# Patient Record
Sex: MALE | Race: WHITE | NOT HISPANIC OR LATINO | Employment: FULL TIME | URBAN - METROPOLITAN AREA
[De-identification: names, ages, dates, MRNs, and addresses within clinical notes are randomized per-mention and may not be internally consistent; named-entity substitution may affect disease eponyms.]

---

## 2017-10-16 ENCOUNTER — APPOINTMENT (OUTPATIENT)
Dept: GENERAL RADIOLOGY | Facility: HOSPITAL | Age: 22
End: 2017-10-16

## 2017-10-16 ENCOUNTER — HOSPITAL ENCOUNTER (EMERGENCY)
Facility: HOSPITAL | Age: 22
Discharge: HOME | End: 2017-10-16
Admitting: EMERGENCY MEDICINE

## 2017-10-16 VITALS
WEIGHT: 215 LBS | HEIGHT: 65 IN | RESPIRATION RATE: 16 BRPM | TEMPERATURE: 98.1 F | OXYGEN SATURATION: 98 % | BODY MASS INDEX: 35.82 KG/M2 | DIASTOLIC BLOOD PRESSURE: 80 MMHG | SYSTOLIC BLOOD PRESSURE: 123 MMHG | HEART RATE: 70 BPM

## 2017-10-16 DIAGNOSIS — B34.9 VIRAL SYNDROME: Primary | ICD-10-CM

## 2017-10-16 LAB
FLUAV AG NPH QL: NEGATIVE
FLUBV AG NPH QL IA: NEGATIVE
HETEROPH AB SER QL LA: NEGATIVE
HOLD SPECIMEN: NORMAL
HOLD SPECIMEN: NORMAL
S PYO AG THROAT QL: NEGATIVE
WHOLE BLOOD HOLD SPECIMEN: NORMAL
WHOLE BLOOD HOLD SPECIMEN: NORMAL

## 2017-10-16 PROCEDURE — 99284 EMERGENCY DEPT VISIT MOD MDM: CPT | Performed by: PHYSICIAN ASSISTANT

## 2017-10-16 PROCEDURE — 87081 CULTURE SCREEN ONLY: CPT | Performed by: PHYSICIAN ASSISTANT

## 2017-10-16 PROCEDURE — 99283 EMERGENCY DEPT VISIT LOW MDM: CPT

## 2017-10-16 PROCEDURE — 87880 STREP A ASSAY W/OPTIC: CPT | Performed by: PHYSICIAN ASSISTANT

## 2017-10-16 PROCEDURE — 86308 HETEROPHILE ANTIBODY SCREEN: CPT | Performed by: PHYSICIAN ASSISTANT

## 2017-10-16 PROCEDURE — 71020 HC CHEST PA AND LATERAL: CPT

## 2017-10-16 PROCEDURE — 87804 INFLUENZA ASSAY W/OPTIC: CPT | Performed by: PHYSICIAN ASSISTANT

## 2017-10-16 RX ORDER — BENZONATATE 100 MG/1
100 CAPSULE ORAL 3 TIMES DAILY PRN
Qty: 20 CAPSULE | Refills: 0 | Status: SHIPPED | OUTPATIENT
Start: 2017-10-16 | End: 2017-10-23

## 2017-10-16 NOTE — DISCHARGE INSTRUCTIONS
Return to the emergency department with worsening symptoms, uncontrolled pain, inability to tolerate oral liquids, fever greater than 101°F not controlled by Tylenol or Motrin or as needed with emergent concerns.

## 2017-10-16 NOTE — ED PROVIDER NOTES
Subjective   History of Present Illness  History of Present Illness    Chief complaint: flu like symptoms    Location: Generalized    Quality/Severity:  Aching, mild to moderate    Timing/Duration: One day    Modifying Factors: Nothing makes worse or better    Associated Symptoms: Positive nausea.  Positive fatigue.  No vomiting or diarrhea.  Positive sore throat.  Positive nonproductive cough.  Positive headache.  Denies neck pain.  Denies photophobia.  Denies chest pain or shortness of breath.    Narrative: 21-year-old male presents with flulike symptoms for one day.  He states he has had been exposed to many people with the flu at work.  He denies any fevers or chills.    Review of Systems  General: Denies fevers or chills. + fatigue.  Denies any weight loss or weight gain.  SKIN: Denies any rashes lesions or ulcers.  Denies color change.  ENT: Positive sore throat.  Denies rhinorrhea.  Denies ear pain.    EYES: Denies any blurred vision.  Denies any change in vision.  Denies any photophobia.  Denies any vision loss.  LUNGS: Denies any shortness of breath or wheezing.  +cough.  Denies any hemoptysis.  CARDIAC: Denies any chest pain.  Denies palpitations.  Denies syncope.  Denies any edema  ABD: Denies any abdominal pain. +nausea.  Denies vomiting or diarrhea.  Denies any rectal bleeding.  Denies constipation  : Denies any dysuria, urgency, frequency or hematuria.  Denies discharge.  Denies flank pain.  NEURO: Denies any focal weakness.  Denies headache.  Denies seizures.  Denies changes in speech or difficulty walking.  ENDOCRINE: Denies polydipsia and polyuria  M/S: Denies arthralgias, back pain, myalgias or neck pain  HEME/LYMPH: Negative for adenopathy. Does not bruise/bleed easily.   PSYCH: Negative for suicidal ideas. Denies anxiety or depression  review was performed in addition to those in the above all other reviews are negative.      Past Medical History:   Diagnosis Date   • Hypertension        No  Known Allergies    Past Surgical History:   Procedure Laterality Date   • ADENOIDECTOMY     • SHOULDER SURGERY     • TONSILLECTOMY         No family history on file.    Social History     Social History   • Marital status: Single     Spouse name: N/A   • Number of children: N/A   • Years of education: N/A     Social History Main Topics   • Smoking status: Never Smoker   • Smokeless tobacco: Not on file   • Alcohol use No   • Drug use: No   • Sexual activity: Defer     Other Topics Concern   • Not on file     Social History Narrative   • No narrative on file     No current facility-administered medications on file prior to encounter.      No current outpatient prescriptions on file prior to encounter.             Objective   Physical Exam  Vitals:    10/16/17 1341   BP: 152/90   Pulse: 94   Resp: 20   Temp: 98.5 °F (36.9 °C)   SpO2: 98%     GENERAL: a/o x 4, NAD  SKIN: Warm pink and dry   HEENT:  PERRLA, EOM intact, conjunctiva normal, sclera clear, TM clear, oropharynx clear with no significant pharyngeal erythema or exudate  NECK: supple, no JVD  LUNGS: Minutes breath sounds on the right otherwise clear to auscultation bilaterally.  No wheezes, rales or rhonchi.  No accessory muscle use and no nasal flaring.  CARDIAC:  Regular rate and rhythm, S1-S2.  No murmurs, rubs or gallops.  No peripheral edema.  Equal pulses bilaterally.  ABDOMEN: Soft, nontender, nondistended.  No guarding or rebound tenderness.  Normal bowel sounds.  MUSCULOSKELETAL: Moves all extremities well.  No deformity.  NEURO: Cranial nerves II through XII grossly intact.  No gross focal deficits.  Alert.  Normal speech and motor.  PSYCH: Normal mood and affect      Procedures         ED Course  ED Course      Results for orders placed or performed during the hospital encounter of 10/16/17   Influenza Antigen, Rapid - Swab, Nasopharynx   Result Value Ref Range    Influenza A Ag, EIA Negative Negative    Influenza B Ag, EIA Negative Negative   Rapid  Strep A Screen - Swab, Throat   Result Value Ref Range    Strep A Ag Negative Negative   Mononucleosis Screen   Result Value Ref Range    Monospot Negative Negative     Xr Chest 2 View    Result Date: 10/16/2017  Narrative: INDICATION:  Cough and weakness.  COMPARISON:  None.  FINDINGS: PA and lateral views of the chest.  Heart and mediastinal contours are normal.  The lungs are clear.  No pneumothorax or pleural effusion.      Impression: Normal chest radiograph.  This report was finalized on 10/16/2017 2:48 PM by Dr. Angel Stone MD.      Reviewed CXR. Independently viewed by me. Interpreted by radiologist. Discussed with pt.    D/c with tessalon. Education given. Pt has been counseled on elevated BP and instructed to f/u with PMD for repeat BP check.            MDM  Number of Diagnoses or Management Options  Viral syndrome: new and requires workup     Amount and/or Complexity of Data Reviewed  Clinical lab tests: reviewed and ordered  Tests in the radiology section of CPT®: reviewed and ordered  Tests in the medicine section of CPT®: ordered and reviewed  Independent visualization of images, tracings, or specimens: yes    Risk of Complications, Morbidity, and/or Mortality  Presenting problems: low  Diagnostic procedures: low  Management options: low    Patient Progress  Patient progress: improved      Final diagnoses:   Viral syndrome     Dictated utilizing Dragon dictation           Lynn Dumas PA-C  10/16/17 [id number]

## 2017-10-18 LAB — BACTERIA SPEC AEROBE CULT: NORMAL

## 2017-10-24 ENCOUNTER — HOSPITAL ENCOUNTER (EMERGENCY)
Facility: HOSPITAL | Age: 22
Discharge: HOME | End: 2017-10-24
Admitting: PHYSICIAN ASSISTANT

## 2017-10-24 VITALS
RESPIRATION RATE: 12 BRPM | WEIGHT: 215 LBS | TEMPERATURE: 98.7 F | OXYGEN SATURATION: 97 % | HEIGHT: 65 IN | BODY MASS INDEX: 35.82 KG/M2 | DIASTOLIC BLOOD PRESSURE: 92 MMHG | SYSTOLIC BLOOD PRESSURE: 128 MMHG | HEART RATE: 83 BPM

## 2017-10-24 DIAGNOSIS — M67.40 GANGLION CYST: Primary | ICD-10-CM

## 2017-10-24 PROCEDURE — 99282 EMERGENCY DEPT VISIT SF MDM: CPT | Performed by: PHYSICIAN ASSISTANT

## 2017-10-24 PROCEDURE — 99283 EMERGENCY DEPT VISIT LOW MDM: CPT

## 2017-10-24 RX ORDER — NAPROXEN 375 MG/1
375 TABLET ORAL 2 TIMES DAILY PRN
Qty: 20 TABLET | Refills: 0 | Status: SHIPPED | OUTPATIENT
Start: 2017-10-24

## 2017-10-24 RX ORDER — IBUPROFEN 400 MG/1
800 TABLET ORAL ONCE
Status: COMPLETED | OUTPATIENT
Start: 2017-10-24 | End: 2017-10-24

## 2017-10-24 RX ADMIN — IBUPROFEN 800 MG: 400 TABLET ORAL at 19:19

## 2017-10-24 NOTE — ED PROVIDER NOTES
"Subjective   History of Present Illness  History of Present Illness    Chief complaint: Bump on left wrist    Location: Left wrist    Quality/Severity:  \"Pain\", mild    Timing/Duration: One year, gradually getting bigger    Modifying Factors: Worse with movement.  Nothing specific makes better although patient has not tried anything.    Associated Symptoms: Denies numbness or tingling.  Denies fevers or chills.  Denies redness.  Denies trauma.    Narrative: 21-year-old male presents with swelling to his left wrist for over a year.  He has not talked her primary care provider about this.  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    Review of Systems  General: Denies fevers or chills.  Denies any weakness or fatigue.  Denies any weight loss or weight gain.  SKIN: Denies any rashes lesions or ulcers.  Denies color change.    HEENT:  Denies any change in vision.  LUNGS: Denies any shortness of breath or wheezing.    CARDIAC: Denies any chest pain.  Denies palpitations.  Denies syncope.  Denies any edema  ABD: Denies any abdominal pain.  Denies any nausea or vomiting or diarrhea.    : Denies any dysuria, urgency, frequency or hematuria.    NEURO: Denies any weakness.  Denies headache.  Denies seizures.  Denies changes in speech or difficulty walking.  M/S: Denies arthralgias, back pain, myalgias or neck pain  PSYCH: Negative for suicidal ideas. Denies anxiety or depression   review was performed in addition to those in the above all other reviews are negative.    Past Medical History:   Diagnosis Date   • Hypertension        No Known Allergies    Past Surgical History:   Procedure Laterality Date   • ADENOIDECTOMY     • KNEE SURGERY     • SHOULDER SURGERY     • TONSILLECTOMY         History reviewed. No pertinent family history.    Social History     Social History   • Marital status: Single     Spouse name: N/A   • Number of children: N/A   • Years of education: N/A     Social History Main Topics   • " Smoking status: Never Smoker   • Smokeless tobacco: Current User     Types: Chew   • Alcohol use No   • Drug use: No   • Sexual activity: Defer     Other Topics Concern   • None     Social History Narrative   • None     No current facility-administered medications on file prior to encounter.      Current Outpatient Prescriptions on File Prior to Encounter   Medication Sig Dispense Refill   • [] benzonatate (TESSALON) 100 MG capsule Take 1 capsule by mouth 3 (Three) Times a Day As Needed for Cough for up to 7 days. 20 capsule 0           Objective   Physical Exam  Vitals:    10/24/17 1856   BP: 147/85   Pulse: 88   Resp: 12   Temp: 98.7 °F (37.1 °C)   SpO2: 97%     GENERAL: Alert and oriented ×4.  No apparent distress.  SKIN: Warm, pink and dry  HEENT: Atraumatic normocephalic  LUNGS: Clear to auscultation bilaterally without wheezes, rales or rhonchi  CARDIAC: Regular rate and rhythm.  S1 and S2.  No murmurs, rubs or gallops.  ABD: Soft, nontender  M/S: MAEW, no deformity, left posterior wrist with small nodular swelling approximately 1 cm.  There is no erythema.  There is no fluctuance or induration.  Full range of motion to the wrist, fingers, elbow and shoulder  PSYCH: Normal mood and affect    Procedures         ED Course  ED Course    Pt has been counseled on elevated BP and instructed to f/u with PMD for repeat BP check.    Motrin given.  Wrist splint given for when patient is working with his hands.  Discharged with naproxen.  Information given on primary care providers in the area.            MDM  Number of Diagnoses or Management Options  Ganglion cyst: new and does not require workup     Amount and/or Complexity of Data Reviewed  Tests in the medicine section of CPT®: ordered and reviewed    Risk of Complications, Morbidity, and/or Mortality  Presenting problems: low  Diagnostic procedures: low  Management options: low    Patient Progress  Patient progress: improved      Final diagnoses:   Ganglion  cyst     Dictated utilizing Dragon dictation           Lynn Dumas PA-C  10/24/17 1917